# Patient Record
Sex: FEMALE | URBAN - METROPOLITAN AREA
[De-identification: names, ages, dates, MRNs, and addresses within clinical notes are randomized per-mention and may not be internally consistent; named-entity substitution may affect disease eponyms.]

---

## 2024-02-23 ENCOUNTER — APPOINTMENT (OUTPATIENT)
Dept: PRIMARY CARE | Facility: CLINIC | Age: 34
End: 2024-02-23
Payer: COMMERCIAL

## 2024-05-29 ENCOUNTER — OFFICE VISIT (OUTPATIENT)
Dept: URGENT CARE | Facility: CLINIC | Age: 34
End: 2024-05-29
Payer: COMMERCIAL

## 2024-05-29 VITALS
TEMPERATURE: 98.5 F | OXYGEN SATURATION: 98 % | DIASTOLIC BLOOD PRESSURE: 108 MMHG | WEIGHT: 141.2 LBS | HEART RATE: 68 BPM | SYSTOLIC BLOOD PRESSURE: 158 MMHG

## 2024-05-29 DIAGNOSIS — K12.2 ORAL INFECTION: Primary | ICD-10-CM

## 2024-05-29 DIAGNOSIS — R68.84 PAIN IN LOWER JAW: ICD-10-CM

## 2024-05-29 DIAGNOSIS — I10 HYPERTENSION, UNSPECIFIED TYPE: ICD-10-CM

## 2024-05-29 DIAGNOSIS — Z76.0 MEDICATION REFILL: ICD-10-CM

## 2024-05-29 PROBLEM — F10.930 ALCOHOL WITHDRAWAL SYNDROME WITHOUT COMPLICATION (MULTI): Status: RESOLVED | Noted: 2020-08-08 | Resolved: 2024-05-29

## 2024-05-29 PROBLEM — O99.619 CONSTIPATION DURING PREGNANCY (HHS-HCC): Status: RESOLVED | Noted: 2021-09-26 | Resolved: 2024-05-29

## 2024-05-29 PROBLEM — D58.2 HEMOGLOBIN C TRAIT (CMS-HCC): Status: ACTIVE | Noted: 2021-08-31

## 2024-05-29 PROBLEM — O26.892 PELVIC PAIN AFFECTING PREGNANCY IN SECOND TRIMESTER, ANTEPARTUM (HHS-HCC): Status: RESOLVED | Noted: 2021-09-26 | Resolved: 2024-05-29

## 2024-05-29 PROBLEM — O99.330: Status: RESOLVED | Noted: 2024-05-29 | Resolved: 2024-05-29

## 2024-05-29 PROBLEM — O26.899 ABDOMINAL PAIN AFFECTING PREGNANCY (HHS-HCC): Status: RESOLVED | Noted: 2022-12-03 | Resolved: 2024-05-29

## 2024-05-29 PROBLEM — N30.90 CYSTITIS: Status: RESOLVED | Noted: 2024-05-29 | Resolved: 2024-05-29

## 2024-05-29 PROBLEM — O10.919 CHRONIC HYPERTENSION AFFECTING PREGNANCY (HHS-HCC): Status: RESOLVED | Noted: 2022-12-05 | Resolved: 2024-05-29

## 2024-05-29 PROBLEM — F12.90 MARIJUANA USE: Status: ACTIVE | Noted: 2021-12-02

## 2024-05-29 PROBLEM — E66.9 OBESITY: Status: ACTIVE | Noted: 2022-07-14

## 2024-05-29 PROBLEM — T14.91XA SUICIDE ATTEMPT (MULTI): Status: RESOLVED | Noted: 2023-08-13 | Resolved: 2024-05-29

## 2024-05-29 PROBLEM — O20.9 VAGINAL BLEEDING AFFECTING EARLY PREGNANCY (HHS-HCC): Status: RESOLVED | Noted: 2022-12-05 | Resolved: 2024-05-29

## 2024-05-29 PROBLEM — F10.11 HISTORY OF ETOH ABUSE: Status: ACTIVE | Noted: 2024-05-29

## 2024-05-29 PROBLEM — R10.2 PELVIC PAIN AFFECTING PREGNANCY IN SECOND TRIMESTER, ANTEPARTUM (HHS-HCC): Status: RESOLVED | Noted: 2021-09-26 | Resolved: 2024-05-29

## 2024-05-29 PROBLEM — F31.81 BIPOLAR 2 DISORDER (MULTI): Status: ACTIVE | Noted: 2022-07-15

## 2024-05-29 PROBLEM — Z14.8 CARRIER OF GENETIC DISORDER: Status: ACTIVE | Noted: 2021-08-31

## 2024-05-29 PROBLEM — O99.332: Status: RESOLVED | Noted: 2021-09-26 | Resolved: 2024-05-29

## 2024-05-29 PROBLEM — R10.9 ABDOMINAL PAIN AFFECTING PREGNANCY (HHS-HCC): Status: RESOLVED | Noted: 2022-12-03 | Resolved: 2024-05-29

## 2024-05-29 PROBLEM — F41.9 ANXIETY: Status: ACTIVE | Noted: 2022-07-15

## 2024-05-29 PROBLEM — K59.00 CONSTIPATION DURING PREGNANCY (HHS-HCC): Status: RESOLVED | Noted: 2021-09-26 | Resolved: 2024-05-29

## 2024-05-29 PROBLEM — R11.0 NAUSEA: Status: RESOLVED | Noted: 2020-08-08 | Resolved: 2024-05-29

## 2024-05-29 PROBLEM — F17.200 TOBACCO USE DISORDER: Status: ACTIVE | Noted: 2024-05-29

## 2024-05-29 PROBLEM — O44.40 LOW-LYING PLACENTA (HHS-HCC): Status: RESOLVED | Noted: 2024-05-29 | Resolved: 2024-05-29

## 2024-05-29 PROBLEM — F10.20 ALCOHOL USE DISORDER, SEVERE, DEPENDENCE (MULTI): Status: ACTIVE | Noted: 2024-05-29

## 2024-05-29 PROBLEM — R93.1 ABNORMAL ECHOCARDIOGRAM: Status: ACTIVE | Noted: 2017-01-01

## 2024-05-29 PROBLEM — Z77.21 CONTACT WITH AND (SUSPECTED) EXPOSURE TO POTENTIALLY HAZARDOUS BODY FLUIDS: Status: RESOLVED | Noted: 2024-05-29 | Resolved: 2024-05-29

## 2024-05-29 PROCEDURE — 3080F DIAST BP >= 90 MM HG: CPT

## 2024-05-29 PROCEDURE — 99214 OFFICE O/P EST MOD 30 MIN: CPT

## 2024-05-29 PROCEDURE — 3077F SYST BP >= 140 MM HG: CPT

## 2024-05-29 RX ORDER — AMLODIPINE BESYLATE 2.5 MG/1
TABLET ORAL
COMMUNITY

## 2024-05-29 RX ORDER — OXYCODONE HYDROCHLORIDE 5 MG/1
CAPSULE ORAL
COMMUNITY
Start: 2023-11-28 | End: 2024-05-29 | Stop reason: ALTCHOICE

## 2024-05-29 RX ORDER — PENICILLIN V POTASSIUM 500 MG/1
500 TABLET, FILM COATED ORAL 4 TIMES DAILY
Qty: 40 TABLET | Refills: 0 | Status: SHIPPED | OUTPATIENT
Start: 2024-05-29 | End: 2024-06-08

## 2024-05-29 RX ORDER — AMOXICILLIN AND CLAVULANATE POTASSIUM 875; 125 MG/1; MG/1
1 TABLET, FILM COATED ORAL EVERY 12 HOURS
COMMUNITY
Start: 2023-08-07

## 2024-05-29 RX ORDER — ESCITALOPRAM OXALATE 10 MG/1
TABLET ORAL
COMMUNITY

## 2024-05-29 RX ORDER — OXYCODONE AND ACETAMINOPHEN 7.5; 325 MG/1; MG/1
1 TABLET ORAL EVERY 8 HOURS PRN
COMMUNITY
Start: 2024-02-23 | End: 2024-05-29 | Stop reason: ALTCHOICE

## 2024-05-29 RX ORDER — FOLIC ACID 1 MG/1
1 TABLET ORAL DAILY
COMMUNITY
Start: 2024-02-23

## 2024-05-29 RX ORDER — TRAMADOL HYDROCHLORIDE 50 MG/1
50 TABLET ORAL EVERY 6 HOURS PRN
Qty: 12 TABLET | Refills: 0 | Status: SHIPPED | OUTPATIENT
Start: 2024-05-29 | End: 2024-06-01

## 2024-05-29 RX ORDER — HYDROCODONE BITARTRATE AND ACETAMINOPHEN 5; 325 MG/1; MG/1
1 TABLET ORAL EVERY 8 HOURS PRN
COMMUNITY
Start: 2024-04-08 | End: 2024-05-29 | Stop reason: ALTCHOICE

## 2024-05-29 RX ORDER — PREDNISONE 5 MG/1
TABLET ORAL DAILY
Qty: 15 TABLET | Refills: 0 | Status: SHIPPED | OUTPATIENT
Start: 2024-05-29 | End: 2024-06-05

## 2024-05-29 RX ORDER — HYDRALAZINE HYDROCHLORIDE 10 MG/1
10 TABLET, FILM COATED ORAL 3 TIMES DAILY
Qty: 90 TABLET | Refills: 0 | Status: SHIPPED | OUTPATIENT
Start: 2024-05-29 | End: 2024-06-28

## 2024-05-29 RX ORDER — HYDRALAZINE HYDROCHLORIDE 10 MG/1
1 TABLET, FILM COATED ORAL 3 TIMES DAILY
COMMUNITY
Start: 2022-06-19 | End: 2024-05-29 | Stop reason: SDUPTHER

## 2024-05-29 RX ORDER — AMLODIPINE BESYLATE 5 MG/1
5 TABLET ORAL DAILY
COMMUNITY

## 2024-05-29 RX ORDER — ARIPIPRAZOLE 10 MG/1
TABLET ORAL
COMMUNITY

## 2024-05-29 RX ORDER — QUETIAPINE 200 MG/1
1 TABLET, FILM COATED, EXTENDED RELEASE ORAL NIGHTLY
COMMUNITY
Start: 2022-07-15

## 2024-05-29 RX ORDER — IBUPROFEN 200 MG
1 TABLET ORAL
COMMUNITY
Start: 2022-07-15

## 2024-05-29 RX ORDER — TRAZODONE HYDROCHLORIDE 50 MG/1
50 TABLET ORAL DAILY PRN
COMMUNITY
Start: 2023-08-17

## 2024-05-29 RX ORDER — OMEPRAZOLE 40 MG/1
CAPSULE, DELAYED RELEASE ORAL
COMMUNITY
Start: 2023-12-04

## 2024-05-29 RX ORDER — ONDANSETRON 4 MG/1
TABLET, ORALLY DISINTEGRATING ORAL
COMMUNITY
Start: 2024-05-22

## 2024-05-29 ASSESSMENT — ENCOUNTER SYMPTOMS
FATIGUE: 1
SORE THROAT: 0
WEAKNESS: 0
ABDOMINAL PAIN: 0
ADENOPATHY: 1
PALPITATIONS: 0
FEVER: 0
DIARRHEA: 0
DIAPHORESIS: 0
CARDIOVASCULAR NEGATIVE: 1
STRIDOR: 0
MYALGIAS: 0
COUGH: 0
SPEECH DIFFICULTY: 0
VOICE CHANGE: 0
FACIAL SWELLING: 1
MUSCULOSKELETAL NEGATIVE: 1
CHILLS: 0
HEADACHES: 0
GASTROINTESTINAL NEGATIVE: 1
CHEST TIGHTNESS: 0
TROUBLE SWALLOWING: 1
SHORTNESS OF BREATH: 0
RESPIRATORY NEGATIVE: 1
DIZZINESS: 0
NAUSEA: 0
NEUROLOGICAL NEGATIVE: 1
VOMITING: 0
NUMBNESS: 0
WHEEZING: 0
ARTHRALGIAS: 0

## 2024-05-29 ASSESSMENT — VISUAL ACUITY: OU: 1

## 2024-05-29 NOTE — PATIENT INSTRUCTIONS
ORAL INFECTION      - PENICILLIN VK 10 days has been prescribed to treat infection in the mouth   - PREDNISONE (steroid) has been prescribed to reduce inflammation and pain - Wait to take until back on all BP medications and check BP before starting that lower than 150/100   - TRAMADOL (12 tablets) was prescribed for intractable pain despite above measures - cautioned about sedation and addiction risk      - Warm salt water oral rinses may help decrease inflammation and promote drainage of infection in the mouth     - Recommended follow-up with your dentist ASAP for more extensive treatment to prevent reoccurrence    - If Medicaid insurance and/or difficulty finding a dentist, there are free or reduced cost dental clinics that accept Medicaid / offer payment plans listed below:     (many offer same/next day appointments)             + [facility] ([address]) [phone]           + [facility] ([address] [facility]) [phone]           + [facility] ([address] [facility]) [phone]           + [facility] ([address]) [phone]             + [facility] ([address] [facility]) [phone]           + [facility] ([address] [facility]) [phone]           + [facility] ([address] [facility]) [phone]           + [facility] ([address] [facility]) [phone]           + [facility] ([address]) [phone]           + [facility] ([address]) [phone]           + [facility] ([address] [facility]) [phone]

## 2024-05-29 NOTE — PROGRESS NOTES
Subjective   History  Jyothi Towsnend is a 33 y.o. female who presents for Dental Pain.    Patient presents with pain of right lower teeth/jaw for the last 3 days. She reports a history of impacted wisdom teeth that has been recommended she have removed, pain with chewing, difficulty swallowing, and fatigue. denies SOB. She reports that she is currently out of her hydralazine for BP which has caused her BP to be higher than normal. Patient reports trying OTC medications including Acetaminophen/Ibuprofen with minimal relief.       History provided by:  Patient and medical records  Dental Pain  Associated symptoms: facial swelling    Associated symptoms: no congestion, no drooling, no fever, no headaches and no oral lesions      No past surgical history on file.      Review of Systems   Constitutional:  Positive for fatigue. Negative for chills, diaphoresis and fever.   HENT:  Positive for dental problem, facial swelling and trouble swallowing. Negative for congestion, drooling, ear pain, mouth sores, sore throat and voice change.    Eyes:  Negative for photophobia and visual disturbance.   Respiratory: Negative.  Negative for cough, chest tightness, shortness of breath, wheezing and stridor.    Cardiovascular: Negative.  Negative for chest pain and palpitations.   Gastrointestinal: Negative.  Negative for abdominal pain, diarrhea, nausea and vomiting.   Musculoskeletal: Negative.  Negative for arthralgias and myalgias.   Skin: Negative.  Negative for rash.   Neurological: Negative.  Negative for dizziness, speech difficulty, weakness, light-headedness, numbness and headaches.   Hematological:  Positive for adenopathy.       Objective   Vital Signs  BP (!) 158/108 (BP Location: Left arm, Patient Position: Sitting)   Pulse 68   Temp 36.9 °C (98.5 °F)   Wt 64 kg (141 lb 3.2 oz)   LMP  (LMP Unknown)   SpO2 98%    All vitals have been reviewed and are stable.    Diagnostic Results    No results found for this or any  previous visit (from the past 12 hour(s)).     Physical Exam  Physical Exam  Vitals and nursing note reviewed.   Constitutional:       General: She is awake. She is not in acute distress.     Appearance: Normal appearance. She is well-developed. She is ill-appearing. She is not toxic-appearing or diaphoretic.   HENT:      Head: Normocephalic and atraumatic.      Nose: Nose normal.      Mouth/Throat:      Lips: Pink.      Mouth: Mucous membranes are moist. No oral lesions.      Dentition: Dental tenderness and gingival swelling present.      Pharynx: Oropharynx is clear. Uvula midline. No pharyngeal swelling, posterior oropharyngeal erythema or uvula swelling.   Eyes:      General: Lids are normal. Vision grossly intact. Gaze aligned appropriately.      Extraocular Movements: Extraocular movements intact.      Conjunctiva/sclera: Conjunctivae normal.      Right eye: Right conjunctiva is not injected.      Left eye: Left conjunctiva is not injected.      Pupils: Pupils are equal, round, and reactive to light.   Cardiovascular:      Rate and Rhythm: Normal rate and regular rhythm.      Heart sounds: Normal heart sounds, S1 normal and S2 normal.   Pulmonary:      Effort: Pulmonary effort is normal. No respiratory distress.   Abdominal:      General: Abdomen is flat. There is no distension.   Musculoskeletal:         General: No swelling or deformity. Normal range of motion.      Cervical back: Full passive range of motion without pain, normal range of motion and neck supple.   Skin:     General: Skin is warm and dry.   Neurological:      General: No focal deficit present.      Mental Status: She is alert and oriented to person, place, and time. Mental status is at baseline.      Motor: Motor function is intact.      Coordination: Coordination is intact.   Psychiatric:         Mood and Affect: Mood and affect normal.         Speech: Speech normal.         Behavior: Behavior normal. Behavior is cooperative.          Thought Content: Thought content normal.         Judgment: Judgment normal.         Assessment/Plan     Problem List Items Addressed This Visit    None  Visit Diagnoses       Oral infection    -  Primary    Relevant Medications    penicillin v potassium (Veetid) 500 mg tablet    traMADol (Ultram) 50 mg tablet    predniSONE (Deltasone) 5 mg tablet    Medication refill        Relevant Medications    hydrALAZINE (Apresoline) 10 mg tablet    Hypertension, unspecified type        Relevant Medications    hydrALAZINE (Apresoline) 10 mg tablet    Pain in lower jaw        Relevant Medications    penicillin v potassium (Veetid) 500 mg tablet    traMADol (Ultram) 50 mg tablet    predniSONE (Deltasone) 5 mg tablet            [facility] Course  MDM    Patient disposition: Home    Red flags for reporting to ER have been reviewed with the patient.    Current diagnosis, any medication changes, and all in-office lab or radiologic results have been reviewed with the patient at the time of the visit.   If symptoms do not improve or worsen, patient is to follow up with PCP or report to the emergency room.   Patient is alert and oriented x3 and non-toxic appearing. Vital signs are stable.   Patient and/or guardian has sufficient decision-making capabilities at this time and reports understanding and agreement with the treatment plan made through shared decision-making.

## 2024-06-12 ASSESSMENT — ENCOUNTER SYMPTOMS
LIGHT-HEADEDNESS: 0
PHOTOPHOBIA: 0

## 2024-06-20 ENCOUNTER — CLINICAL SUPPORT (OUTPATIENT)
Dept: URGENT CARE | Facility: CLINIC | Age: 34
End: 2024-06-20
Payer: COMMERCIAL

## 2024-06-20 VITALS — SYSTOLIC BLOOD PRESSURE: 70 MMHG | DIASTOLIC BLOOD PRESSURE: 40 MMHG | OXYGEN SATURATION: 98 % | HEART RATE: 87 BPM

## 2024-06-20 DIAGNOSIS — R10.84 GENERALIZED ABDOMINAL PAIN: Primary | ICD-10-CM

## 2024-06-20 PROCEDURE — 99213 OFFICE O/P EST LOW 20 MIN: CPT | Performed by: NURSE PRACTITIONER

## 2024-06-20 RX ORDER — IBUPROFEN 600 MG/1
600 TABLET ORAL EVERY 6 HOURS PRN
Qty: 30 TABLET | Refills: 0 | Status: SHIPPED | OUTPATIENT
Start: 2024-06-20 | End: 2024-07-20

## 2024-06-20 ASSESSMENT — ENCOUNTER SYMPTOMS: ABDOMINAL PAIN: 1

## 2024-06-20 NOTE — PROGRESS NOTES
Subjective   Patient ID: Jyothi Townsend is a 34 y.o. female.    Patient presents requesting Motrin for pancreatitis.  She does not want to go to the hospital she states the pain is similar to her previous admission she is admitted 5 times this year does not want her to the hospital because she has a child and responsibilities at home rates the pain 6 out of 10 over the last 2 to 3 days with some nausea and vomiting.  States that she wants to try the Motrin and stay out of the hospital with a plain diet.  Denies any fever, chills, diarrhea, burning with urination, increased urinary frequency.      Abdominal Pain        The following portions of the chart were reviewed this encounter and updated as appropriate:         Review of Systems   Gastrointestinal:  Positive for abdominal pain.   All other systems reviewed and are negative.    Objective   Physical Exam  Vitals and nursing note reviewed.   Constitutional:       General: She is not in acute distress.     Appearance: Normal appearance. She is not toxic-appearing.   HENT:      Head: Normocephalic.      Right Ear: External ear normal.      Left Ear: External ear normal.      Nose: Nose normal.      Mouth/Throat:      Mouth: Mucous membranes are moist.      Pharynx: No oropharyngeal exudate or posterior oropharyngeal erythema.   Eyes:      Extraocular Movements: Extraocular movements intact.   Cardiovascular:      Rate and Rhythm: Normal rate and regular rhythm.      Heart sounds: Normal heart sounds.   Pulmonary:      Effort: Pulmonary effort is normal.      Breath sounds: Normal breath sounds. No wheezing.   Abdominal:      General: Abdomen is flat. Bowel sounds are normal.      Palpations: Abdomen is soft.      Tenderness: There is abdominal tenderness in the epigastric area and periumbilical area.   Musculoskeletal:         General: Normal range of motion.      Cervical back: Normal range of motion and neck supple.   Skin:     Capillary Refill: Capillary refill  takes less than 2 seconds.   Neurological:      General: No focal deficit present.      Mental Status: She is alert and oriented to person, place, and time.   Psychiatric:         Mood and Affect: Mood normal.         Behavior: Behavior normal.         Thought Content: Thought content normal.       Procedures    Assessment/Plan   Diagnoses and all orders for this visit:  Generalized abdominal pain  -     ibuprofen 600 mg tablet; Take 1 tablet (600 mg) by mouth every 6 hours if needed for moderate pain (4 - 6) or mild pain (1 - 3).  I tried to assist with the patient's pain with ibuprofen treat which is a stronger size should prescribe year.  I did advise her multiple times to go to the hospital for IV fluid resuscitation given her low blood pressure readings and advised her not to take any of her blood pressure medications today and monitor but if she starts to get dizzy, lightheaded she should go to the ER although I feel that she should go immediately she refuses to because she has a child at home and more responsibilities.      Patient disposition: Home

## 2024-06-20 NOTE — PATIENT INSTRUCTIONS
Use motrin for pain  Plain diet  Monitor blood pressure  If you get lightheaded of dizzy go to the emergency department    Blood pressures in office 80/40

## 2024-07-26 ENCOUNTER — APPOINTMENT (OUTPATIENT)
Dept: URGENT CARE | Facility: CLINIC | Age: 34
End: 2024-07-26
Payer: COMMERCIAL